# Patient Record
Sex: FEMALE | Race: WHITE | URBAN - METROPOLITAN AREA
[De-identification: names, ages, dates, MRNs, and addresses within clinical notes are randomized per-mention and may not be internally consistent; named-entity substitution may affect disease eponyms.]

---

## 2017-09-06 ENCOUNTER — GENERIC CONVERSION - ENCOUNTER (OUTPATIENT)
Dept: OTHER | Facility: OTHER | Age: 32
End: 2017-09-06

## 2018-01-12 NOTE — RESULT NOTES
Verified Results  (1) CBC/PLT/DIFF [id number] 08:41AM [employer]     Test Name Result Flag Reference   WHITE BLOOD CELL COUNT 5 6 Thousand/uL  3 8-10 8   RED BLOOD CELL COUNT 4 73 Million/uL  3 80-5 10   HEMOGLOBIN 13 2 g/dL  11 7-15 5   HEMATOCRIT 40 7 %  35 0-45 0   MCV 85 9 fL  80 0-100 0   MCH 27 9 pg  27 0-33 0   MCHC 32 5 g/dL  32 0-36 0   RDW 15 4 % H 11 0-15 0   PLATELET COUNT 845 Thousand/uL  140-400   MPV 9 6 fL  7 5-11 5   ABSOLUTE NEUTROPHILS 3674 cells/uL  7551-9757   ABSOLUTE LYMPHOCYTES 1462 cells/uL  850-3900   ABSOLUTE MONOCYTES 308 cells/uL  200-950   ABSOLUTE EOSINOPHILS 129 cells/uL     ABSOLUTE BASOPHILS 28 cells/uL  0-200   NEUTROPHILS 65 6 %     LYMPHOCYTES 26 1 %     MONOCYTES 5 5 %     EOSINOPHILS 2 3 %     BASOPHILS 0 5 %       (1) COMPREHENSIVE METABOLIC PANEL [id number] 47:39TI [employer]     Test Name Result Flag Reference   GLUCOSE 88 mg/dL  65-99   Fasting reference interval   UREA NITROGEN (BUN) 11 mg/dL  7-25   CREATININE 0 85 mg/dL  0 50-1 10   eGFR NON-AFR   AMERICAN 91 mL/min/1 73m2  > OR = 60   eGFR AFRICAN AMERICAN 105 mL/min/1 73m2  > OR = 60   BUN/CREATININE RATIO   2-51   NOT APPLICABLE (calc)   SODIUM 139 mmol/L  135-146   POTASSIUM 4 2 mmol/L  3 5-5 3   CHLORIDE 105 mmol/L     CARBON DIOXIDE 27 mmol/L  20-31   CALCIUM 9 4 mg/dL  8 6-10 2   PROTEIN, TOTAL 6 6 g/dL  6 1-8 1   ALBUMIN 4 2 g/dL  3 6-5 1   GLOBULIN 2 4 g/dL (calc)  1 9-3 7   ALBUMIN/GLOBULIN RATIO 1 8 (calc)  1 0-2 5   BILIRUBIN, TOTAL 1 3 mg/dL H 0 2-1 2   ALKALINE PHOSPHATASE 84 U/L     AST 13 U/L  10-30   ALT 12 U/L  6-29     (Q) LIPID PANEL WITH REFLEX TO DIRECT LDL [id number] 08:41AM [employer]     Test Name Result Flag Reference   CHOLESTEROL, TOTAL 183 mg/dL  125-200   HDL CHOLESTEROL 54 mg/dL  > OR = 46   TRIGLICERIDES 53 mg/dL  <893   LDL-CHOLESTEROL 118 mg/dL (calc)  <130   Desirable range <100 mg/dL for patients with CHD or  diabetes and <70 mg/dL for diabetic patients with  known heart disease  CHOL/HDLC RATIO 3 4 (calc)  < OR = 5 0   NON HDL CHOLESTEROL 129 mg/dL (calc)     Target for non-HDL cholesterol is 30 mg/dL higher than   LDL cholesterol target       (Q) TSH, 3RD GENERATION [id number] 08:41AM Aw[facility] He   REPORT COMMENT:  FASTING:YES     Test Name Result Flag Reference   TSH 1 90 mIU/L     Reference Range                         > or = 20 Years  0 40-4 50                              Pregnancy Ranges            First trimester    0 26-2 66            Second trimester   0 55-2 73            Third trimester    0 43-2 91

## 2024-02-02 ENCOUNTER — HOSPITAL ENCOUNTER (OUTPATIENT)
Dept: HOSPITAL 99 - WDC | Age: 39
End: 2024-02-02
Payer: COMMERCIAL

## 2024-02-02 DIAGNOSIS — Z12.31: Primary | ICD-10-CM

## 2024-12-16 ENCOUNTER — HOSPITAL ENCOUNTER (OUTPATIENT)
Dept: HOSPITAL 99 - GI | Age: 39
End: 2024-12-16
Payer: COMMERCIAL

## 2024-12-16 DIAGNOSIS — K64.0: ICD-10-CM

## 2024-12-16 DIAGNOSIS — K63.5: ICD-10-CM

## 2024-12-16 DIAGNOSIS — Z86.0100: ICD-10-CM

## 2024-12-16 DIAGNOSIS — Z12.11: Primary | ICD-10-CM

## 2024-12-16 PROCEDURE — 45385 COLONOSCOPY W/LESION REMOVAL: CPT

## 2024-12-16 PROCEDURE — 88305 TISSUE EXAM BY PATHOLOGIST: CPT

## 2025-02-04 ENCOUNTER — HOSPITAL ENCOUNTER (OUTPATIENT)
Dept: HOSPITAL 99 - HWWDC | Age: 40
End: 2025-02-04
Payer: COMMERCIAL

## 2025-02-04 DIAGNOSIS — Z12.31: Primary | ICD-10-CM
